# Patient Record
Sex: MALE | Race: WHITE | NOT HISPANIC OR LATINO | URBAN - METROPOLITAN AREA
[De-identification: names, ages, dates, MRNs, and addresses within clinical notes are randomized per-mention and may not be internally consistent; named-entity substitution may affect disease eponyms.]

---
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
## 2022-02-11 VITALS
HEART RATE: 80 BPM | TEMPERATURE: 98 F | DIASTOLIC BLOOD PRESSURE: 64 MMHG | WEIGHT: 155.4 LBS | HEIGHT: 70 IN | SYSTOLIC BLOOD PRESSURE: 122 MMHG | BODY MASS INDEX: 22.25 KG/M2

## 2022-02-12 VITALS
HEART RATE: 88 BPM | WEIGHT: 152.2 LBS | SYSTOLIC BLOOD PRESSURE: 130 MMHG | HEIGHT: 70 IN | TEMPERATURE: 98 F | BODY MASS INDEX: 21.79 KG/M2 | DIASTOLIC BLOOD PRESSURE: 60 MMHG

## 2022-02-15 NOTE — PROGRESS NOTES
Patient:   ANYI JHA            MRN: [id number]            FIN: [id number]               Age:   16 years     Sex:  Male     :  2000   Associated Diagnoses:   's permit physical examination   Author:   Grover Lyon PA-C      Visit Information      Date of Service: 2017 07:10 am  Performing Location: [facility]  Encounter#: [id number]      Primary Care Provider (PCP):  NONE ,       Referring Provider:  Grover Lyon PA-C    NPI# [id number]   Visit type:  DOT exam.    Accompanied by:  No one.    Source of history:  Self.    Referral source:  Self.       Chief Complaint   2017 9:17 AM CDT    DOT PX and UA     Here for DOT exam.      Well Adult History   Well Adult History   See scanned DOT form for history..        Review of Systems   Constitutional:  Negative.    Eye:  Negative.    Ear/Nose/Mouth/Throat:  Negative.    Respiratory:  Negative.    Cardiovascular:  Negative.    Gastrointestinal:  Negative.    Genitourinary:  Negative.    Hematology/Lymphatics:  Negative.    Endocrine:  Negative.    Immunologic:  Negative.    Musculoskeletal:  Negative.    Integumentary:  Negative.    Neurologic:  Negative.    Psychiatric:  Negative.    All other systems reviewed and negative      Health Status   Allergies:    Allergic Reactions (All)  Severity Not Documented  Shellfish (No reactions were documented)  Canceled/Inactive Reactions (All)  No known allergies   Medications:  (Selected)      Problem list:    No problem items selected or recorded.      Histories   Past Medical History:    No active or resolved past medical history items have been selected or recorded.   Family History:    No family history items have been selected or recorded.   Procedure history:    No active procedure history items have been selected or recorded.   Social History:             No active social history items have been recorded.      Physical Examination   Vital Signs   2017 9:17 AM CDT Temperature  Tympanic 98.0 DegF    Peripheral Pulse Rate 88 bpm    Pulse Site Radial artery    HR Method Manual    Systolic Blood Pressure 130 mmHg    Diastolic Blood Pressure 60 mmHg    Mean Arterial Pressure 83 mmHg    BP Site Right arm    BP Method Manual      Measurements from flowsheet : Measurements   6/28/2017 9:17 AM CDT Height Measured - Standard 69.5 in    Weight Measured - Standard 152.2 lb    BSA 1.84 m2    Body Mass Index 22.15 kg/m2    Body Mass Index Percentile 47.88      General:  Alert and oriented, No acute distress.    Eye:  Pupils are equal, round and reactive to light, Extraocular movements are intact, Normal conjunctiva, Vision unchanged.    HENT:  Normocephalic, Tympanic membranes are clear, Normal hearing, Oral mucosa is moist, No pharyngeal erythema.    Neck:  Supple, Non-tender, No lymphadenopathy.    Respiratory:  Lungs are clear to auscultation, Respirations are non-labored, Breath sounds are equal.    Cardiovascular:  Normal rate, Regular rhythm, No murmur.    Gastrointestinal:  Soft, Non-tender, Non-distended, Normal bowel sounds, No organomegaly.    Genitourinary:  No costovertebral angle tenderness.    Musculoskeletal:  Normal range of motion, Normal strength, No tenderness, Normal gait.    Integumentary:  Warm, Moist, No rash.    Neurologic:  Alert, Oriented, No focal deficits, Normal deep tendon reflexes, Romberg negative..    Psychiatric:  Cooperative, Appropriate mood & affect.       Health Maintenance      Recommendations     Pending (in the next year)        Due            Alcohol Misuse Screen (Male) due  06/28/17  and every 1  year(s)           Depression Screen (Male) due  06/28/17  and every 1  year(s)           HIV Screen (if sexually active) (Male) due  06/28/17  and every 1  year(s)           STD Counseling (if sexually active) (Male) due  06/28/17  and every 1  year(s)           Syphilis Screen (if sexually active) (Male) due  06/28/17  and every 1  year(s)           Well Child 2 yrs  - 18 yrs due  06/28/17  and every 1  year(s)     Satisfied (in the past 1 year)        Satisfied            Body Mass Index Check (Male) on  06/28/17.           Body Mass Index Check (Male) on  02/21/17.           Body Mass Index Check (Male) on  12/09/16.           High Blood Pressure Screen (Male) on  06/28/17.           High Blood Pressure Screen (Male) on  02/21/17.           High Blood Pressure Screen (Male) on  12/09/16.           Tobacco Use Screen (Male) on  06/28/17.           Tobacco Use Screen (Male) on  02/21/17.           Tobacco Use Screen (Male) on  12/09/16.        Review / Management   Results review:  See UA report..       Impression and Plan   Diagnosis     's permit physical examination (MPF45-UU Z02.4).     Cleared for two years. Intrastate only as age 16.

## 2022-02-15 NOTE — PROGRESS NOTES
Patient:   ANYI JHA            MRN: [id number]            FIN: [id number]               Age:   16 years     Sex:  Male     :  2000   Associated Diagnoses:   Corneal foreign body   Author:   Luz CHAVEZ, Jax      Procedure   Eye foreign body removal procedure   Date:  2017.     Confirmed: patient.     Performed by: self.     Informed consent: Verbally Obtained.     Location: right cornea.     Preparation and technique: local ophthalmic anesthesia proparacaine 0.5% instilled, eye foreign body removed using cotton-tipped stick, rust ring removed using ophthalmic red, anti-infective ophthalmic medication instilled, soft eye patch applied.     Findings: corneal foreign body rust ring.        Impression and Plan   Diagnosis     Corneal foreign body (BVD26-DR T15.01XA).     Orders     Remove patch tomorrow and return if has any problems..